# Patient Record
Sex: MALE | Race: WHITE | NOT HISPANIC OR LATINO | RURAL
[De-identification: names, ages, dates, MRNs, and addresses within clinical notes are randomized per-mention and may not be internally consistent; named-entity substitution may affect disease eponyms.]

---

## 2020-02-26 ENCOUNTER — OFFICE VISIT (OUTPATIENT)
Dept: RETAIL CLINIC | Facility: CLINIC | Age: 50
End: 2020-02-26

## 2020-02-26 VITALS
HEART RATE: 115 BPM | TEMPERATURE: 100.5 F | RESPIRATION RATE: 20 BRPM | OXYGEN SATURATION: 98 % | BODY MASS INDEX: 27.09 KG/M2 | WEIGHT: 200 LBS | SYSTOLIC BLOOD PRESSURE: 122 MMHG | HEIGHT: 72 IN | DIASTOLIC BLOOD PRESSURE: 68 MMHG

## 2020-02-26 DIAGNOSIS — J10.1 TYPE A INFLUENZA: Primary | ICD-10-CM

## 2020-02-26 LAB
EXPIRATION DATE: ABNORMAL
FLUAV AG NPH QL: POSITIVE
FLUBV AG NPH QL: NEGATIVE
INTERNAL CONTROL: ABNORMAL
Lab: ABNORMAL

## 2020-02-26 PROCEDURE — 87804 INFLUENZA ASSAY W/OPTIC: CPT | Performed by: NURSE PRACTITIONER

## 2020-02-26 PROCEDURE — 99203 OFFICE O/P NEW LOW 30 MIN: CPT | Performed by: NURSE PRACTITIONER

## 2020-02-26 NOTE — PROGRESS NOTES
Subjective   Flu Symptoms    Fabien Schafer is a 49 y.o. male who presents with flu symptoms.     Flu Symptoms   This is a new problem. The current episode started yesterday. The problem occurs constantly. The problem has been unchanged. Associated symptoms include chills, congestion, coughing, fatigue, a fever, headaches, myalgias and vertigo. Pertinent negatives include no abdominal pain, chest pain, nausea, neck pain, rash, sore throat or vomiting. Nothing aggravates the symptoms. He has tried nothing for the symptoms.        History Obtained from: Patient    Past Medical History:   Diagnosis Date   • Acid reflux    • Closed fracture dislocation of lumbar spine (CMS/HCC)    • Depression    • Electrocution      Past Surgical History:   Procedure Laterality Date   • AMPUTATION DIGIT     • FEMUR IM NAILING/RODDING       Social History     Tobacco Use   • Smoking status: Current Every Day Smoker     Packs/day: 1.00     Years: 35.00     Pack years: 35.00     Types: Cigarettes   • Smokeless tobacco: Never Used   Substance Use Topics   • Alcohol use: Yes     Comment: occasional   • Drug use: Never     Family History   Problem Relation Age of Onset   • Diabetes Mother    • Stroke Father    • Heart disease Father    • Heart disease Other      No Known Allergies  Current Outpatient Medications   Medication Sig Dispense Refill   • buPROPion HCl (WELLBUTRIN PO) Take  by mouth.     • Baloxavir Marboxil,80 MG Dose, (XOFLUZA) 2 x 40 MG tablet therapy pack Take 2 tablets by mouth 1 (One) Time for 1 dose. 1 each 0     No current facility-administered medications for this visit.         The following portions of the patient's history were reviewed and updated as appropriate: allergies, current medications, past family history, past medical history, past social history and past surgical history.    Review of Systems   Constitutional: Positive for chills, fatigue and fever.   HENT: Positive for congestion and rhinorrhea. Negative for  "ear pain, postnasal drip, sore throat, trouble swallowing and voice change.    Eyes: Negative.    Respiratory: Positive for cough. Negative for chest tightness, shortness of breath and wheezing.    Cardiovascular: Negative for chest pain and palpitations.   Gastrointestinal: Negative for abdominal pain, nausea and vomiting.   Endocrine: Negative.    Genitourinary: Negative.    Musculoskeletal: Positive for myalgias. Negative for neck pain.   Skin: Negative for rash and wound.   Allergic/Immunologic: Negative for immunocompromised state.   Neurological: Positive for dizziness, vertigo and headaches.   Hematological: Negative.    Psychiatric/Behavioral: Positive for sleep disturbance. Negative for agitation and hallucinations. The patient is not nervous/anxious.        Objective     VITAL SIGNS:   Vitals:    02/26/20 1342   BP: 122/68   Pulse: 115   Resp: 20   Temp: 100.5 °F (38.1 °C)   SpO2: 98%   Weight: 90.7 kg (200 lb)   Height: 181.6 cm (71.5\")   Body mass index is 27.51 kg/m².    Physical Exam   Constitutional: He is cooperative.  Non-toxic appearance. He appears ill. No distress.   HENT:   Head: Atraumatic.   Right Ear: External ear and ear canal normal. Tympanic membrane is erythematous. Tympanic membrane is not perforated.   Left Ear: External ear and ear canal normal. Tympanic membrane is erythematous. Tympanic membrane is not perforated.   Nose: Mucosal edema and rhinorrhea present.   Mouth/Throat: Uvula is midline and mucous membranes are normal. Uvula swelling present. Posterior oropharyngeal edema and posterior oropharyngeal erythema present. No oropharyngeal exudate.   Eyes: Pupils are equal, round, and reactive to light. Lids are normal. No scleral icterus.   Neck: Normal range of motion and phonation normal. Neck supple. No tracheal deviation present.   Cardiovascular: Regular rhythm. Tachycardia present.   No murmur heard.  Pulmonary/Chest: Breath sounds normal. No accessory muscle usage. No " tachypnea. No respiratory distress.   Dry coughing during exam   Lymphadenopathy:     He has no cervical adenopathy.        Right: No supraclavicular adenopathy present.        Left: No supraclavicular adenopathy present.   Neurological: He is alert. Gait normal.   Skin: Skin is warm. Capillary refill takes less than 2 seconds. No rash noted. No cyanosis. No pallor.   Psychiatric: His behavior is normal. He is attentive.       LABS:   Results for orders placed or performed in visit on 02/26/20   POCT Influenza A/B   Result Value Ref Range    Rapid Influenza A Ag Positive (A) Negative    Rapid Influenza B Ag Negative Negative    Internal Control Passed Passed    Lot Number [id number]     Expiration Date 05/06/2022            Assessment/Plan     Fabien was seen today for flu symptoms.    Diagnoses and all orders for this visit:    Type A influenza  -     POCT Influenza A/B    Other orders  -     Baloxavir Marboxil,80 MG Dose, (XOFLUZA) 2 x 40 MG tablet therapy pack; Take 2 tablets by mouth 1 (One) Time for 1 dose.        PLAN:  Discussed OTC management of symptoms. Rationale for Xofluza discussed in addition to potential side effect sand s/s adverse reaction.  Patient advised to increase oral intake decaffeinated fluids. Patient should follow up with primary care provider if symptoms fail to improve, worsen or for the development of new symptoms that need attention.    The patient/family voiced understanding and agreement to the patient treatment plan and instructions         EBONI Garcia

## 2020-02-26 NOTE — PATIENT INSTRUCTIONS
"Influenza, Adult  Influenza is also called \"the flu.\" It is an infection in the lungs, nose, and throat (respiratory tract). It is caused by a virus. The flu causes symptoms that are similar to symptoms of a cold. It also causes a high fever and body aches.  The flu spreads easily from person to person (is contagious). Getting a flu shot (influenza vaccination) every year is the best way to prevent the flu.  What are the causes?  This condition is caused by the influenza virus. You can get the virus by:  · Breathing in droplets that are in the air from the cough or sneeze of a person who has the virus.  · Touching something that has the virus on it (is contaminated) and then touching your mouth, nose, or eyes.  What increases the risk?  Certain things may make you more likely to get the flu. These include:  · Not washing your hands often.  · Having close contact with many people during cold and flu season.  · Touching your mouth, eyes, or nose without first washing your hands.  · Not getting a flu shot every year.  You may have a higher risk for the flu, along with serious problems such as a lung infection (pneumonia), if you:  · Are older than 65.  · Are pregnant.  · Have a weakened disease-fighting system (immune system) because of a disease or taking certain medicines.  · Have a long-term (chronic) illness, such as:  ? Heart, kidney, or lung disease.  ? Diabetes.  ? Asthma.  · Have a liver disorder.  · Are very overweight (morbidly obese).  · Have anemia. This is a condition that affects your red blood cells.  What are the signs or symptoms?  Symptoms usually begin suddenly and last 4-14 days. They may include:  · Fever and chills.  · Headaches, body aches, or muscle aches.  · Sore throat.  · Cough.  · Runny or stuffy (congested) nose.  · Chest discomfort.  · Not wanting to eat as much as normal (poor appetite).  · Weakness or feeling tired (fatigue).  · Dizziness.  · Feeling sick to your stomach (nauseous) or " throwing up (vomiting).  How is this treated?  If the flu is found early, you can be treated with medicine that can help reduce how bad the illness is and how long it lasts (antiviral medicine). This may be given by mouth (orally) or through an IV tube.  Taking care of yourself at home can help your symptoms get better. Your doctor may suggest:  · Taking over-the-counter medicines.  · Drinking plenty of fluids.  The flu often goes away on its own. If you have very bad symptoms or other problems, you may be treated in a hospital.  Follow these instructions at home:         Activity  · Rest as needed. Get plenty of sleep.  · Stay home from work or school as told by your doctor.  ? Do not leave home until you do not have a fever for 24 hours without taking medicine.  ? Leave home only to visit your doctor.  Eating and drinking  · Take an ORS (oral rehydration solution). This is a drink that is sold at pharmacies and stores.  · Drink enough fluid to keep your pee (urine) pale yellow.  · Drink clear fluids in small amounts as you are able. Clear fluids include:  ? Water.  ? Ice chips.  ? Fruit juice that has water added (diluted fruit juice).  ? Low-calorie sports drinks.  · Eat bland, easy-to-digest foods in small amounts as you are able. These foods include:  ? Bananas.  ? Applesauce.  ? Rice.  ? Lean meats.  ? Toast.  ? Crackers.  · Do not eat or drink:  ? Fluids that have a lot of sugar or caffeine.  ? Alcohol.  ? Spicy or fatty foods.  General instructions  · Take over-the-counter and prescription medicines only as told by your doctor.  · Use a cool mist humidifier to add moisture to the air in your home. This can make it easier for you to breathe.  · Cover your mouth and nose when you cough or sneeze.  · Wash your hands with soap and water often, especially after you cough or sneeze. If you cannot use soap and water, use alcohol-based hand .  · Keep all follow-up visits as told by your doctor. This is  "important.  How is this prevented?    · Get a flu shot every year. You may get the flu shot in late summer, fall, or winter. Ask your doctor when you should get your flu shot.  · Avoid contact with people who are sick during fall and winter (cold and flu season).  Contact a doctor if:  · You get new symptoms.  · You have:  ? Chest pain.  ? Watery poop (diarrhea).  ? A fever.  · Your cough gets worse.  · You start to have more mucus.  · You feel sick to your stomach.  · You throw up.  Get help right away if you:  · Have shortness of breath.  · Have trouble breathing.  · Have skin or nails that turn a bluish color.  · Have very bad pain or stiffness in your neck.  · Get a sudden headache.  · Get sudden pain in your face or ear.  · Cannot eat or drink without throwing up.  Summary  · Influenza (\"the flu\") is an infection in the lungs, nose, and throat. It is caused by a virus.  · Take over-the-counter and prescription medicines only as told by your doctor.  · Getting a flu shot every year is the best way to avoid getting the flu.  This information is not intended to replace advice given to you by your health care provider. Make sure you discuss any questions you have with your health care provider.  Document Released: 09/26/2009 Document Revised: 06/05/2019 Document Reviewed: 06/05/2019  [employer] Patient Education © 2020 [employer]    "

## 2022-06-10 ENCOUNTER — HOSPITAL ENCOUNTER (OUTPATIENT)
Age: 52
End: 2022-06-10
Payer: COMMERCIAL

## 2022-06-10 DIAGNOSIS — Z82.49: ICD-10-CM

## 2022-06-10 DIAGNOSIS — K55.1: ICD-10-CM

## 2022-06-10 DIAGNOSIS — R07.9: Primary | ICD-10-CM

## 2022-06-10 DIAGNOSIS — I71.4: ICD-10-CM

## 2022-06-10 DIAGNOSIS — Z72.0: ICD-10-CM

## 2022-06-10 DIAGNOSIS — I10: ICD-10-CM

## 2022-06-10 DIAGNOSIS — R53.83: ICD-10-CM

## 2022-06-10 PROCEDURE — A9502 TC99M TETROFOSMIN: HCPCS

## 2022-06-10 PROCEDURE — 78452 HT MUSCLE IMAGE SPECT MULT: CPT

## 2022-06-10 PROCEDURE — 76705 ECHO EXAM OF ABDOMEN: CPT

## 2022-06-10 PROCEDURE — 93306 TTE W/DOPPLER COMPLETE: CPT

## 2022-06-10 PROCEDURE — 93017 CV STRESS TEST TRACING ONLY: CPT

## 2023-08-18 ENCOUNTER — OFFICE VISIT (OUTPATIENT)
Dept: FAMILY MEDICINE CLINIC | Facility: CLINIC | Age: 53
End: 2023-08-18
Payer: MEDICAID

## 2023-08-18 VITALS
OXYGEN SATURATION: 98 % | WEIGHT: 194 LBS | BODY MASS INDEX: 26.28 KG/M2 | HEART RATE: 88 BPM | TEMPERATURE: 98 F | SYSTOLIC BLOOD PRESSURE: 112 MMHG | DIASTOLIC BLOOD PRESSURE: 74 MMHG | HEIGHT: 72 IN | RESPIRATION RATE: 18 BRPM

## 2023-08-18 DIAGNOSIS — F32.A ANXIETY AND DEPRESSION: Primary | ICD-10-CM

## 2023-08-18 DIAGNOSIS — F41.9 ANXIETY AND DEPRESSION: Primary | ICD-10-CM

## 2023-08-18 RX ORDER — BUPROPION HYDROCHLORIDE 150 MG/1
150 TABLET ORAL DAILY
Qty: 90 TABLET | Refills: 1 | Status: SHIPPED | OUTPATIENT
Start: 2023-08-18

## 2023-08-18 NOTE — ASSESSMENT & PLAN NOTE
presents today for complaints of anxiety and depression.  Patient regularly followed by Dr. Tod Anand, however Dr. Anand is out of town for the next several weeks.  Patient reports longstanding history of anxiety and depression since the unexpected death of his son 4 years ago in a motor vehicle accident.  Patient weaned himself off bupropion approximately 2 months ago.  Done well until approximately 10 days ago when he became very stressed and overwhelmed during a work situation.  Since that time he has had difficulty with racing thoughts and feeling overwhelmed or overstimulated.  The same symptoms that manifested when he was initiated on bupropion treatment.  [place] he needed to get treated now as opposed to waiting until Dr. Anand returns to town.  Denies SI or HI.  Does endorse some difficulty sleeping at night due to racing thoughts.  Continues to find simone in hobbies, for [place].   -Initiate bupropion  mg once daily  -Patient advised to follow-up with regular PCP in 4 weeks to evaluate efficacy, potential for increased dosage

## 2023-08-18 NOTE — PROGRESS NOTES
Office Note     Name: Fabien Schafer    : 1970     MRN: [id number]     Chief Complaint  anxiety and depression    Subjective     History of Present Illness:  Fabien Schafer is a 53 y.o. male who presents today for complaints of anxiety and depression.  Patient regularly followed by Dr. Tod Anand, however Dr. Anand is out of town for the next several weeks.  Patient reports longstanding history of anxiety and depression since the unexpected death of his son 4 years ago in a motor vehicle accident.  Patient weaned himself off bupropion approximately 2 months ago.  Done well until approximately 10 days ago when he became very stressed and overwhelmed during a work situation.  Since that time he has had difficulty with racing thoughts and feeling overwhelmed or overstimulated.  The same symptoms that manifested when he was initiated on bupropion treatment.  [place] he needed to get treated now as opposed to waiting until Dr. Anand returns to town.  Denies SI or HI.  Does endorse some difficulty sleeping at night due to racing thoughts.  Continues to find simone in hobbies, for example [place] the [place] after his visit today.  He has no further complaints or concerns    Review of Systems   Constitutional:  Negative for activity change.   Eyes:  Negative for visual disturbance.   Respiratory:  Negative for cough, chest tightness, shortness of breath and wheezing.    Cardiovascular:  Negative for chest pain, palpitations and leg swelling.   Gastrointestinal:  Negative for abdominal pain, constipation, diarrhea, nausea and vomiting.   Neurological:  Negative for dizziness, light-headedness, numbness and headache.   Psychiatric/Behavioral:  Positive for agitation, decreased concentration and sleep disturbance. Negative for self-injury and suicidal ideas.      Objective     Past Medical History:   Diagnosis Date    Acid reflux     Anxiety     Closed fracture dislocation of lumbar spine     Depression      "Electrocution      Past Surgical History:   Procedure Laterality Date    AMPUTATION DIGIT      FEMUR IM NAILING/RODDING       Family History   Problem Relation Age of Onset    Stroke Father     Heart disease Father     Heart disease Other        Vital Signs  /74 (BP Location: Left arm, Patient Position: Sitting, Cuff Size: Adult)   Pulse 88   Temp 98 øF (36.7 øC) (Temporal)   Resp 18   Ht 182.9 cm (72\")   Wt 88 kg (194 lb)   SpO2 98%   BMI 26.31 kg/mý   Estimated body mass index is 26.31 kg/mý as calculated from the following:    Height as of this encounter: 182.9 cm (72\").    Weight as of this encounter: 88 kg (194 lb).    Physical Exam  Vitals reviewed.   Constitutional:       Appearance: Normal appearance.   HENT:      Head: Normocephalic and atraumatic.      Right Ear: Tympanic membrane, ear canal and external ear normal.      Left Ear: Tympanic membrane, ear canal and external ear normal.      Nose: Nose normal.      Mouth/Throat:      Pharynx: Oropharynx is clear.   Eyes:      Conjunctiva/sclera: Conjunctivae normal.   Cardiovascular:      Rate and Rhythm: Normal rate and regular rhythm.      Pulses: Normal pulses.      Heart sounds: Normal heart sounds.   Pulmonary:      Effort: Pulmonary effort is normal.      Breath sounds: Normal breath sounds.   Skin:     General: Skin is warm and dry.   Neurological:      Mental Status: He is alert and oriented to person, place, and time.   Psychiatric:         Mood and Affect: Mood normal.         Behavior: Behavior normal.         Thought Content: Thought content normal.         Judgment: Judgment normal.             POCT Results (if applicable):  Results for orders placed or performed in visit on 02/26/20   POCT Influenza A/B    Specimen: Swab   Result Value Ref Range    Rapid Influenza A Ag Positive (A) Negative    Rapid Influenza B Ag Negative Negative    Internal Control Passed Passed    Lot Number [id number]     Expiration Date 05/06/2022         "     Assessment and Plan     Diagnoses and all orders for this visit:    1. Anxiety and depression (Primary)  Assessment & Plan:  presents today for complaints of anxiety and depression.  Patient regularly followed by Dr. Tod Anand, however Dr. Anand is out of town for the next several weeks.  Patient reports longstanding history of anxiety and depression since the unexpected death of his son 4 years ago in a motor vehicle accident.  Patient weaned himself off bupropion approximately 2 months ago.  Done well until approximately 10 days ago when he became very stressed and overwhelmed during a work situation.  Since that time he has had difficulty with racing thoughts and feeling overwhelmed or overstimulated.  The same symptoms that manifested when he was initiated on bupropion treatment.  [place] he needed to get treated now as opposed to waiting until Dr. Anand returns to town.  Denies SI or HI.  Does endorse some difficulty sleeping at night due to racing thoughts.  Continues to find simone in hobbies, for example [place] after his visit today.   -Initiate bupropion  mg once daily  -Patient advised to follow-up with regular PCP in 4 weeks to evaluate efficacy, potential for increased dosage    Orders:  -     buPROPion XL (Wellbutrin XL) 150 MG 24 hr tablet; Take 1 tablet by mouth Daily.  Dispense: 90 tablet; Refill: 1          Follow Up  No follow-ups on file.    EBONI Islas

## 2024-02-28 DIAGNOSIS — F32.A ANXIETY AND DEPRESSION: ICD-10-CM

## 2024-02-28 DIAGNOSIS — F41.9 ANXIETY AND DEPRESSION: ICD-10-CM

## 2024-02-28 RX ORDER — BUPROPION HYDROCHLORIDE 150 MG/1
150 TABLET ORAL DAILY
Qty: 90 TABLET | Refills: 1 | Status: SHIPPED | OUTPATIENT
Start: 2024-02-28

## 2024-05-09 ENCOUNTER — HOSPITAL ENCOUNTER (EMERGENCY)
Age: 54
Discharge: HOME | End: 2024-05-09
Payer: COMMERCIAL

## 2024-05-09 VITALS — HEART RATE: 86 BPM | OXYGEN SATURATION: 96 % | SYSTOLIC BLOOD PRESSURE: 168 MMHG | DIASTOLIC BLOOD PRESSURE: 86 MMHG

## 2024-05-09 VITALS
OXYGEN SATURATION: 95 % | HEART RATE: 73 BPM | SYSTOLIC BLOOD PRESSURE: 154 MMHG | RESPIRATION RATE: 16 BRPM | DIASTOLIC BLOOD PRESSURE: 94 MMHG | TEMPERATURE: 97.88 F

## 2024-05-09 VITALS — OXYGEN SATURATION: 95 % | SYSTOLIC BLOOD PRESSURE: 154 MMHG | DIASTOLIC BLOOD PRESSURE: 94 MMHG | HEART RATE: 73 BPM

## 2024-05-09 VITALS — OXYGEN SATURATION: 95 % | HEART RATE: 75 BPM | SYSTOLIC BLOOD PRESSURE: 157 MMHG | DIASTOLIC BLOOD PRESSURE: 92 MMHG

## 2024-05-09 VITALS
TEMPERATURE: 97.88 F | RESPIRATION RATE: 20 BRPM | SYSTOLIC BLOOD PRESSURE: 177 MMHG | DIASTOLIC BLOOD PRESSURE: 102 MMHG | OXYGEN SATURATION: 98 % | HEART RATE: 76 BPM

## 2024-05-09 VITALS — BODY MASS INDEX: 27.4 KG/M2

## 2024-05-09 VITALS — DIASTOLIC BLOOD PRESSURE: 84 MMHG | SYSTOLIC BLOOD PRESSURE: 146 MMHG

## 2024-05-09 DIAGNOSIS — R10.32: ICD-10-CM

## 2024-05-09 DIAGNOSIS — N13.0: Primary | ICD-10-CM

## 2024-05-09 DIAGNOSIS — N13.4: ICD-10-CM

## 2024-05-09 DIAGNOSIS — Z87.442: ICD-10-CM

## 2024-05-09 DIAGNOSIS — M54.59: ICD-10-CM

## 2024-05-09 DIAGNOSIS — Z87.891: ICD-10-CM

## 2024-05-09 LAB
ALBUMIN LEVEL: 4.3 G/DL (ref 3.5–5)
ALBUMIN/GLOB SERPL: 1.4 {RATIO} (ref 1.1–1.8)
ALP ISO SERPL-ACNC: 112 U/L (ref 38–126)
ALT SERPLBLD-CCNC: 34 U/L (ref 12–78)
ANION GAP SERPL CALC-SCNC: 8.6 MEQ/L (ref 5–15)
AST SERPL QL: 35 U/L (ref 17–59)
BACTERIA URNS QL MICRO: (no result) /LPF
BILIRUBIN,TOTAL: 1 MG/DL (ref 0.2–1.3)
BUN SERPL-MCNC: 13 MG/DL (ref 9–20)
CALCIUM SPEC-MCNC: 9.4 MG/DL (ref 8.4–10.2)
CHLORIDE SPEC-SCNC: 107 MMOL/L (ref 98–107)
CO2 SERPL-SCNC: 28 MMOL/L (ref 22–30)
COLOR UR: YELLOW
CREATININE CLEARANCE ESTIMATED: 96 ML/MIN (ref 50–200)
CREATININE,SERUM: 1.1 MG/DL (ref 0.66–1.25)
ESTIMATED GLOMERULAR FILT RATE: 69 ML/MIN (ref 60–?)
GFR (AFRICAN AMERICAN): 84 ML/MIN (ref 60–?)
GLOBULIN SER CALC-MCNC: 3 G/DL (ref 1.3–3.2)
GLUCOSE: 107 MG/DL (ref 74–100)
HCT VFR BLD CALC: 46.3 % (ref 42–52)
HGB BLD-MCNC: 15.1 G/DL (ref 14.1–18)
LIPASE: 73 U/L (ref 23–300)
MCHC RBC-ENTMCNC: 32.6 G/DL (ref 31.8–35.4)
MCV RBC: 96.5 FL (ref 80–94)
MEAN CORPUSCULAR HEMOGLOBIN: 31.5 PG (ref 27–31.2)
MICRO URNS: (no result)
PH UR: 7 [PH] (ref 5–8.5)
PLATELET # BLD: 232 K/MM3 (ref 142–424)
POTASSIUM: 3.6 MMOL/L (ref 3.5–5.1)
PROT SERPL-MCNC: 7.3 G/DL (ref 6.3–8.2)
RBC # BLD AUTO: 4.79 M/MM3 (ref 4.6–6.2)
RBC #/AREA URNS HPF: (no result) #/HPF (ref 0–3)
SODIUM SPEC-SCNC: 140 MMOL/L (ref 136–145)
SP GR UR: 1.02 (ref 1–1.03)
UROBILINOGEN UR QL: 1 EU/DL
WBC # BLD AUTO: 9.6 K/MM3 (ref 4.8–10.8)
WBC # UR: (no result) #/HPF (ref 0–3)

## 2024-05-09 PROCEDURE — 81001 URINALYSIS AUTO W/SCOPE: CPT

## 2024-05-09 PROCEDURE — 80053 COMPREHEN METABOLIC PANEL: CPT

## 2024-05-09 PROCEDURE — 96374 THER/PROPH/DIAG INJ IV PUSH: CPT

## 2024-05-09 PROCEDURE — 74177 CT ABD & PELVIS W/CONTRAST: CPT

## 2024-05-09 PROCEDURE — 96361 HYDRATE IV INFUSION ADD-ON: CPT

## 2024-05-09 PROCEDURE — 96375 TX/PRO/DX INJ NEW DRUG ADDON: CPT

## 2024-05-09 PROCEDURE — 83690 ASSAY OF LIPASE: CPT

## 2024-05-09 PROCEDURE — 99285 EMERGENCY DEPT VISIT HI MDM: CPT

## 2024-05-09 PROCEDURE — 85025 COMPLETE CBC W/AUTO DIFF WBC: CPT
